# Patient Record
Sex: FEMALE | Race: WHITE | NOT HISPANIC OR LATINO
[De-identification: names, ages, dates, MRNs, and addresses within clinical notes are randomized per-mention and may not be internally consistent; named-entity substitution may affect disease eponyms.]

---

## 2019-10-31 PROBLEM — Z00.00 ENCOUNTER FOR PREVENTIVE HEALTH EXAMINATION: Status: ACTIVE | Noted: 2019-10-31

## 2019-11-18 ENCOUNTER — APPOINTMENT (OUTPATIENT)
Dept: ORTHOPEDIC SURGERY | Facility: CLINIC | Age: 64
End: 2019-11-18
Payer: COMMERCIAL

## 2019-11-18 VITALS — BODY MASS INDEX: 35.55 KG/M2 | WEIGHT: 240 LBS | HEIGHT: 69 IN

## 2019-11-18 DIAGNOSIS — Z80.9 FAMILY HISTORY OF MALIGNANT NEOPLASM, UNSPECIFIED: ICD-10-CM

## 2019-11-18 DIAGNOSIS — Z87.09 PERSONAL HISTORY OF OTHER DISEASES OF THE RESPIRATORY SYSTEM: ICD-10-CM

## 2019-11-18 DIAGNOSIS — M25.561 PAIN IN RIGHT KNEE: ICD-10-CM

## 2019-11-18 DIAGNOSIS — Z78.9 OTHER SPECIFIED HEALTH STATUS: ICD-10-CM

## 2019-11-18 PROCEDURE — 99203 OFFICE O/P NEW LOW 30 MIN: CPT

## 2019-11-18 PROCEDURE — 73562 X-RAY EXAM OF KNEE 3: CPT | Mod: RT

## 2019-11-18 RX ORDER — ACETAMINOPHEN 160 MG/5ML
500 SUSPENSION ORAL
Refills: 0 | Status: ACTIVE | COMMUNITY

## 2019-11-18 RX ORDER — BUPROPION HYDROCHLORIDE 75 MG/1
75 TABLET, FILM COATED ORAL
Refills: 0 | Status: ACTIVE | COMMUNITY

## 2019-11-18 RX ORDER — FLUOXETINE HYDROCHLORIDE 40 MG/1
40 CAPSULE ORAL
Refills: 0 | Status: ACTIVE | COMMUNITY

## 2019-11-18 RX ORDER — METOPROLOL SUCCINATE 100 MG
100 TABLET, EXTENDED RELEASE 24 HR ORAL
Refills: 0 | Status: ACTIVE | COMMUNITY

## 2019-11-18 RX ORDER — AMLODIPINE BESYLATE 10 MG/1
10 TABLET ORAL
Refills: 0 | Status: ACTIVE | COMMUNITY

## 2019-12-26 ENCOUNTER — APPOINTMENT (OUTPATIENT)
Dept: ORTHOPEDIC SURGERY | Facility: CLINIC | Age: 64
End: 2019-12-26
Payer: COMMERCIAL

## 2019-12-26 VITALS — HEIGHT: 69 IN | BODY MASS INDEX: 35.55 KG/M2 | WEIGHT: 240 LBS

## 2019-12-26 PROCEDURE — 99214 OFFICE O/P EST MOD 30 MIN: CPT

## 2019-12-26 PROCEDURE — 73562 X-RAY EXAM OF KNEE 3: CPT | Mod: LT

## 2019-12-26 NOTE — DISCUSSION/SUMMARY
[de-identified] : I went over the pathophysiology of the patient's symptoms in great detail and used models to aid in my explanation. I stressed the importance of continued weight loss and its benefits to the patient’s joints and overall health. At this time, she will start a course of physical therapy for strengthening and flexibility. A prescription for physical therapy was provided. We discussed the use of ice, Tylenol and anti-inflammatories to relieve pain. The patient was instructed in ROM exercises they are to do at home. At-home strengthening exercises were discussed and demonstrated with the patient. She needs to avoid high-impact activities such as running and jumping. I recommend alternative activities such as riding a stationary bike on low tension, or the elliptical. She should focus on light weight and high repetition exercises. She  should avoid squatting, and kneeling. If her symptoms do not resolve we will discuss the possibility of cortisone injection. \par \par FU 1 month.

## 2019-12-26 NOTE — PHYSICAL EXAM
[de-identified] : Constitutional\par o Appearance : well-nourished, well developed, alert, in no acute distress \par Head and Face\par o Head :\par ¦ Inspection : atraumatic, normocephalic\par o Face :\par ¦ Inspection : no visible rash or discoloration\par Respiratory\par o Respiratory Effort: breathing unlabored \par Neurologic\par o Mental Status Examination :\par ¦ Orientation : alert and oriented X 3\par Psychiatric\par o Mood and Affect: mood normal, affect appropriate\par Cardiovascular\par o Observation/Palpation : - no swelling\par Lymphatic\par o Additional Nodes : No palpable lymph nodes present\par \par Right Lower Extremity\par o Buttock : no tenderness, swelling or deformities \par o Right Hip :\par    - Inspection/Palpation : no tenderness, no swelling or deformities\par    - Range of Motion : full and painless in all planes, no crepitance\par    - Stability : joint stability intact\par    - Strength : extension, flexion, adduction, abduction, internal rotation and external rotation 4/5\par    - Tests: Irvin’s test negative\par o Right Knee :\par ¦ Inspection/Palpation : moderate medial compartment tenderness no lateral compartment tenderness to palpation, no swelling\par ¦ Range of Motion : active flexion and extension full and painless, no crepitance\par ¦ Stability : no valgus or varus instability present on provocative testing\par ¦ Strength : flexion and extension 4/5\par ¦ Tests and Signs : negative Anterior Drawer, negative Lachman, negative Zaina\par \par Left Lower Extremity\par o Buttock : no tenderness, swelling or deformities \par o Left Hip :\par    - Inspection/Palpation : no tenderness, no swelling or deformities\par    - Range of Motion : full and painless in all planes, no crepitance\par    - Stability : joint stability intact\par    - Strength : extension, flexion, adduction, abduction, internal rotation and external rotation 4-/5 \par    - Tests: Irvin’s test negative\par o Left Knee :\par ¦ Inspection/Palpation : moderate medial compartment tenderness no lateral compartment tenderness to palpation, no swelling\par ¦ Range of Motion : active flexion and extension full and painless, no crepitance\par ¦ Stability : no valgus or varus instability present on provocative testing\par ¦ Strength : flexion and extension 4-/5\par ¦ Tests and Signs : negative Anterior Drawer, negative Lachman, negative Zaina\par \par Gait and Station:\par Gait: gait normal, no significant extremity swelling or lymphedema, good proprioception and balance, unable to do a sit up\par \par Radiology Results \par o Left Knee : Standing AP,lateral and tunnel views of the knee were obtained and revealed moderate medial and moderate to severe patellofemoral arthritis

## 2019-12-26 NOTE — HISTORY OF PRESENT ILLNESS
[de-identified] : 64 year old female presents with right knee and left knee pain for 1.5 months. She cannot attribute this pain to any specific injury. She reports difficulty stair climbing and pain and stiffness after prolonged sitting.  She has buckling episodes while ambulating.  She denies injury. She has taken Motrin which has provided some relief for her symptoms.  She is not athletically active and does not exercise on a daily basis.  She notes no recent falls or trauma. She has been going to PT and states moderate symptom relief. She states that her right  knee feels better, but her left  knee has begun to hurt her due to her compensatory mechanism.

## 2019-12-26 NOTE — ADDENDUM
[FreeTextEntry1] : I, Sterling Dunaway , acted solely as a scribe for Dr. Donte Javris on this date 12/26/2019.\par All medical record entries made by the Scribe were at my, Dr. Donte Jarvis, direction and personally dictated by me on 12/26/2019. I have reviewed the chart and agree that the record accurately reflects my personal performance of the history, physical exam, assessment and plan. I have also personally directed, reviewed, and agreed with the chart.

## 2020-02-06 ENCOUNTER — APPOINTMENT (OUTPATIENT)
Dept: ORTHOPEDIC SURGERY | Facility: CLINIC | Age: 65
End: 2020-02-06
Payer: COMMERCIAL

## 2020-02-06 VITALS — WEIGHT: 240 LBS | HEIGHT: 69 IN | BODY MASS INDEX: 35.55 KG/M2

## 2020-02-06 PROCEDURE — 99213 OFFICE O/P EST LOW 20 MIN: CPT

## 2020-03-12 ENCOUNTER — APPOINTMENT (OUTPATIENT)
Dept: ORTHOPEDIC SURGERY | Facility: CLINIC | Age: 65
End: 2020-03-12
Payer: COMMERCIAL

## 2020-03-12 PROCEDURE — 99213 OFFICE O/P EST LOW 20 MIN: CPT

## 2020-03-12 NOTE — ADDENDUM
[FreeTextEntry1] : I, Ken Guerrero, acted solely as a scribe for Dr. Donte Jarvis on this date 03/12/2020 .\par All medical record entries made by the Scribe were at my, Dr. Donte Jarvis, direction and personally dictated by me on 03/12/2020 . I have reviewed the chart and agree that the record accurately reflects my personal performance of the history, physical exam, assessment and plan. I have also personally directed, reviewed, and agreed with the chart.

## 2020-03-12 NOTE — HISTORY OF PRESENT ILLNESS
[de-identified] : 64 year old female presents with right knee and left knee pain for 3 months. She cannot attribute this pain to any specific injury. She reports difficulty stair climbing and pain and stiffness after prolonged sitting.  She has buckling episodes while ambulating.  She denies injury. She has taken Motrin which has provided some relief for her symptoms.  She is not athletically active and does not exercise on a daily basis.  She notes no recent falls or trauma. She has been going to PT and states moderate symptom relief but has stopped due to loss in family. She states that her right  knee feels better, but her left  knee has begun to hurt her due to her compensatory mechanism. Her left knee is more painful today than her right knee.She Localizes the pain to the left iliotibial band as well

## 2020-03-12 NOTE — DISCUSSION/SUMMARY
[de-identified] : I went over the pathophysiology of the patient's symptoms in great detail and used models to aid in my explanation. I stressed the importance of continued weight loss and its benefits to the patient’s joints and overall health. We discussed the use of ice, Tylenol and anti-inflammatories to relieve pain. The patient was instructed in ROM exercises they are to do at home. At-home strengthening, and stretching exercises were discussed and demonstrated with the patient. The patient was instructed in ROM exercises they are to do at home. At this time, she will start a course of physical therapy for strengthening and flexibility. A prescription for physical therapy was provided. I stressed the importance of continued weight loss and its benefits to the patient’s joints and overall health. She needs to avoid high-impact activities such as running and jumping. I recommend alternate activities such as yoga, pilates, barre classes, toning classes, and riding a stationary bike on low tension. We went over the wide ranging benefits of exercise for the patient health and I encouraged her to begin a diligent exercise program. All of her questions were answered. She is understanding and consents to the plan. \par \par FU 6 weeks

## 2020-03-12 NOTE — PHYSICAL EXAM
[de-identified] : Constitutional\par o Appearance : well-nourished, well developed, alert, in no acute distress \par Head and Face\par o Head :\par ¦ Inspection : atraumatic, normocephalic\par o Face :\par ¦ Inspection : no visible rash or discoloration\par Respiratory\par o Respiratory Effort: breathing unlabored \par Neurologic\par o Mental Status Examination :\par ¦ Orientation : alert and oriented X 3\par Psychiatric\par o Mood and Affect: mood normal, affect appropriate\par Cardiovascular\par o Observation/Palpation : - no swelling\par Lymphatic\par o Additional Nodes : No palpable lymph nodes present\par \par Right Lower Extremity\par o Buttock : no tenderness, swelling or deformities \par o Right Hip :\par    - Inspection/Palpation : no tenderness, no swelling or deformities\par    - Range of Motion : full and painless in all planes, no crepitance\par    - Stability : joint stability intact\par    - Strength : extension, flexion, adduction, abduction 4+/5, internal rotation and external rotation 4/5\par    - Tests: Irvin’s test negative\par o Right Knee :\par ¦ Inspection/Palpation : no tenderness, no swelling\par ¦ Range of Motion : active flexion and extension full and pain on full extension, no crepitance, decreased patellofemoral glide\par ¦ Stability : no valgus or varus instability present on provocative testing\par ¦ Strength : flexion and extension 5/5\par ¦ Tests and Signs : negative Anterior Drawer, negative Lachman, negative Zaina\par \par Left Lower Extremity\par o Buttock : no tenderness, swelling or deformities \par o Left Hip :\par    - Inspection/Palpation :diffuse ITB tenderness, no swelling or deformities\par    - Range of Motion : full and painless in all planes, no crepitance\par    - Stability : joint stability intact\par    - Strength : extension, flexion, adduction, abduction 3+/5, internal rotation and external rotation 4/5 \par    - Tests: Irvin’s test negative\par o Left Knee :\par ¦ Inspection/Palpation : no swelling, tenderness over IT band\par ¦ Range of Motion : active flexion and extension full and pain on full extension, no crepitance decreased patellofemoral glide\par ¦ Stability : no valgus or varus instability present on provocative testing\par ¦ Strength : flexion and extension 4+/5\par ¦ Tests and Signs : negative Anterior Drawer, negative Lachman, negative Zaina\par \par Gait and Station:\par Gait: gait normal, no significant extremity swelling or lymphedema, good proprioception and balance, unable to do a sit up, poor core strength

## 2020-04-23 ENCOUNTER — APPOINTMENT (OUTPATIENT)
Dept: ORTHOPEDIC SURGERY | Facility: CLINIC | Age: 65
End: 2020-04-23

## 2020-06-15 ENCOUNTER — APPOINTMENT (OUTPATIENT)
Dept: ORTHOPEDIC SURGERY | Facility: CLINIC | Age: 65
End: 2020-06-15
Payer: COMMERCIAL

## 2020-06-15 PROCEDURE — 99214 OFFICE O/P EST MOD 30 MIN: CPT

## 2020-06-15 PROCEDURE — 73502 X-RAY EXAM HIP UNI 2-3 VIEWS: CPT | Mod: LT

## 2020-06-15 PROCEDURE — 72100 X-RAY EXAM L-S SPINE 2/3 VWS: CPT

## 2020-06-15 NOTE — HISTORY OF PRESENT ILLNESS
[de-identified] : 64 year old female presents with left lateral hip pain and buttock pain. The pain began 2-3 weeks ago. There was no injury. Her pain does radiate into her hamstring and inner thigh but does not cross the knee into the lower leg. The pain is only on the left side. She denies numbness and tingling. She notes that she is listing to the right while walking. Her lower back does not ache very much. She has exacerbated pain on stairs and ascending is worse than descending. She is fairly okay with walking on flat ground. Long distances can be challenging. She also has some pain when laying on her left side at night. She denies groin pain. Her knees are not bothering her very much at this time. The patient is present today with her . Of Note: She is on Plavix and Aspirin.

## 2020-06-15 NOTE — ADDENDUM
[FreeTextEntry1] : I, Alexsander Gay, acted solely as a scribe for Dr. Donte Jarvis on this date 06/15/2020.\par All medical record entries made by the Scribe were at my, Dr. Donte Jarvis, direction and personally dictated by me on 06/15/2020. I have reviewed the chart and agree that the record accurately reflects my personal performance of the history, physical exam, assessment and plan. I have also personally directed, reviewed, and agreed with the chart.

## 2020-06-15 NOTE — DISCUSSION/SUMMARY
[de-identified] : I discussed the underlying pathophysiology of the patient's condition in great detail with the patient. I went over the patient's x-rays with them in great detail. We discussed the use of ice, Tylenol and anti-inflammatories to relieve pain.  A prescription for Celebrex was provided. The patient was instructed in ROM exercises they are to do at home. At-home strengthening, and stretching exercises were discussed and demonstrated with the patient. We went over the wide ranging benefits of exercise for the patient health and I encouraged her to begin a diligent exercise program. She needs to avoid high-impact activities such as running and jumping. I recommend alternative activities such as riding a stationary bike on low tension, or the elliptical. She should focus on light weight and high repetition exercises. She should avoid squatting, and kneeling. I stressed the importance of continued weight loss and its benefits to the patient’s joints and overall health. Proper cane walking protocol were discussed and demonstrated with the patient. At this time, she will start a course of physical therapy for strengthening and flexibility. A prescription for physical therapy was provided. All of her questions were answered. She understands and consents to the plan. This treatment plan was discussed and reviewed with the pt's  who agrees with the treatment course.

## 2020-06-15 NOTE — PHYSICAL EXAM
[de-identified] : Constitutional\par o Appearance : well-nourished, well developed, alert, in no acute distress \par Head and Face\par o Head :\par ¦ Inspection : atraumatic, normocephalic\par o Face :\par ¦ Inspection : no visible rash or discoloration\par Respiratory\par o Respiratory Effort: breathing unlabored \par Neurologic\par o Mental Status Examination :\par ¦ Orientation : alert and oriented X 3\par Psychiatric\par o Mood and Affect: mood normal, affect appropriate\par Cardiovascular\par o Observation/Palpation : - no swelling\par Lymphatic\par o Additional Nodes : No palpable lymph nodes present\par \par Lumbosacral Spine\par o Inspection : no visible rash or discoloration\par o Palpation : pain with sidebending L>R, left SI joint and sciatic notch tenderness\par o Range of Motion : arc of motion full in all planes, no crepitance or pain with ROM\par o Muscle Strength : paraspinal muscle strength and tone within normal limits\par o Muscle Tone : paraspinal muscle strength and tone within normal limits\par Tests: straight leg test negative and BAILEY test negative bilaterally \par \par Right Lower Extremity\par o Buttock : no tenderness, swelling or deformities \par o Right Hip :\par    - Inspection/Palpation : no tenderness, no swelling or deformities\par    - Range of Motion : full and painless in all planes, full flexion and rotation, no crepitance\par    - Stability : joint stability intact\par    - Strength : extension, flexion, adduction 4-/5, abduction 4+/5, internal rotation and external rotation 4-/5\par    - Tests: Irvin’s test negative\par o Right Knee :\par ¦ Inspection/Palpation : no tenderness, no swelling\par ¦ Range of Motion : active flexion and extension full and pain on full extension, no crepitance, decreased patellofemoral glide\par ¦ Stability : no valgus or varus instability present on provocative testing\par ¦ Strength : flexion and extension 4-/5\par ¦ Tests and Signs : negative Anterior Drawer, negative Lachman, negative Zaina\par \par Left Lower Extremity\par o Buttock : no tenderness, swelling or deformities \par o Left Hip :\par    - Inspection/Palpation :diffuse greater  trochanteric and ITB tenderness, no swelling or deformities\par    - Range of Motion : full and painless in all planes, no crepitance\par    - Stability : joint stability intact\par    - Strength : extension, flexion, adduction, abduction 3+/5, internal rotation and external rotation 3+/5 \par    - Tests: Irvin’s test negative\par o Left Knee :\par ¦ Inspection/Palpation : no swelling, tenderness over IT band\par ¦ Range of Motion : active flexion and extension full and pain on full extension, no crepitance decreased patellofemoral glide\par ¦ Stability : no valgus or varus instability present on provocative testing\par ¦ Strength : flexion and extension 3+/5\par ¦ Tests and Signs : negative Anterior Drawer, negative Lachman, negative Zaina\par \par Gait and Station:\par Gait: Abductor lurch, no significant extremity swelling or lymphedema, good proprioception and balance, unable to do a sit up, poor core strength\par \par Radiology Results\par o Lumbosacral Spine : AP and lateral views were obtained and reveal diffuse facet arthropathy with severe foraminal stenosis of L5/S1\par o Hip and Pelvis: AP pelvis and lateral of both hips are obtained and reveal mild-moderate degenerative arthritis of both hips R>L

## 2020-06-16 RX ORDER — MELOXICAM 15 MG/1
15 TABLET ORAL
Qty: 30 | Refills: 3 | Status: ACTIVE | COMMUNITY
Start: 2020-06-16 | End: 1900-01-01

## 2020-07-15 ENCOUNTER — APPOINTMENT (OUTPATIENT)
Dept: ORTHOPEDIC SURGERY | Facility: CLINIC | Age: 65
End: 2020-07-15
Payer: COMMERCIAL

## 2020-07-15 PROCEDURE — 99214 OFFICE O/P EST MOD 30 MIN: CPT

## 2020-07-15 RX ORDER — CELECOXIB 200 MG/1
200 CAPSULE ORAL
Qty: 30 | Refills: 3 | Status: DISCONTINUED | COMMUNITY
Start: 2020-06-15 | End: 2020-07-15

## 2020-07-15 NOTE — HISTORY OF PRESENT ILLNESS
[de-identified] : 64 year old female presents with left lateral hip pain and buttock pain. The pain began 2 months ago. There was no injury. Her pain does radiate into her hamstring and inner thigh but does not cross the knee into the lower leg. She denies groin pain. The pain is only on the left side. She denies numbness and tingling. She notes that she is listing to the right while walking. Her lower back does not ache very much. She has exacerbated pain on stairs and ascending is worse than descending. She has exacerbated pain with sitting for long periods of time. She is fairly okay with walking on flat ground. Long distances can be challenging. She also pain when laying on her left side at night, which makes sleeping difficult. Her knees are not bothering her very much at this time and she denies buckling. She has been attending PT. She tried taking Mobic but notes that her hair started falling out so she stopped taking it. She has been managing her pain with Tylenol without much relief. Of Note: She is on Aspirin.\par \par Radiology Results done 6/15/2020 revealed\par o Lumbosacral Spine : AP and lateral views were obtained and reveal diffuse facet arthropathy with severe foraminal stenosis of L5/S1.\par o Hip and Pelvis: AP pelvis and lateral of both hips are obtained and reveal mild-moderate degenerative arthritis of both hips, right worse than left.

## 2020-07-15 NOTE — PHYSICAL EXAM
[de-identified] : Constitutional\par o Appearance : well-nourished, well developed, alert, in no acute distress \par Head and Face\par o Head :\par ¦ Inspection : atraumatic, normocephalic\par o Face :\par ¦ Inspection : no visible rash or discoloration\par Respiratory\par o Respiratory Effort: breathing unlabored \par Neurologic\par o Mental Status Examination :\par ¦ Orientation : alert and oriented X 3\par Psychiatric\par o Mood and Affect: mood normal, affect appropriate\par Cardiovascular\par o Observation/Palpation : - no swelling\par Lymphatic\par o Additional Nodes : No palpable lymph nodes present\par \par Lumbosacral Spine\par o Inspection : no visible rash or discoloration\par o Palpation : left SI joint and sciatic notch tenderness\par o Range of Motion : discomfort with extension, sidebending to the left causes back pain but not leg pain, no discomfort with rotation, no crepitance with ROM\par o Muscle Strength : paraspinal muscle strength and tone within normal limits\par o Muscle Tone : paraspinal muscle strength and tone within normal limits\par Tests: straight leg test negative and BAILEY test negative bilaterally \par \par Right Lower Extremity\par o Buttock : no tenderness, swelling or deformities \par o Right Hip :\par    - Inspection/Palpation : no tenderness, no swelling or deformities\par    - Range of Motion : full flexion and mild discomfort with rotation, no crepitance\par    - Stability : joint stability intact\par    - Strength : extension, flexion, adduction, internal rotation and external rotation 4-/5, abduction 4+/5\par    - Tests: Irvin’s test negative\par o Right Knee :\par ¦ Inspection/Palpation : no tenderness, no swelling\par ¦ Range of Motion : active flexion and extension full and pain on full extension, no crepitance, decreased patellofemoral glide\par ¦ Stability : no valgus or varus instability present on provocative testing\par ¦ Strength : flexion and extension 4-/5\par ¦ Tests and Signs : negative Anterior Drawer, negative Lachman, negative Zaina\par \par Left Lower Extremity\par o Buttock : no tenderness, swelling or deformities \par o Left Hip :\par    - Inspection/Palpation : tenderness over the IT band, no swelling or deformities\par    - Range of Motion : full in all planes, internal rotation no discomfort, external rotation causes mild discomfort, no crepitance\par    - Stability : joint stability intact\par    - Strength : extension, flexion, adduction, internal rotation and external rotation 4-/5, abduction 3+/5\par    - Tests: Irvin’s test negative\par o Left Knee :\par ¦ Inspection/Palpation : no swelling, tenderness over IT band\par ¦ Range of Motion : active flexion and extension full and pain on full extension, no crepitance decreased patellofemoral glide\par ¦ Stability : no valgus or varus instability present on provocative testing\par ¦ Strength : flexion and extension 4-/5\par ¦ Tests and Signs : negative Anterior Drawer, negative Lachman, negative Zaina\par \par Gait and Station:\par Gait: Abductor lurch, no significant extremity swelling or lymphedema, good proprioception and balance, unable to do a sit up, poor core strength

## 2020-07-15 NOTE — ADDENDUM
[FreeTextEntry1] : I, Alexasnder Gay, acted solely as a scribe for Dr. Donte Jarvis on this date 07/15/2020.\par All medical record entries made by the Scribe were at my, Dr. Donte Jarvis, direction and personally dictated by me on 07/15/2020. I have reviewed the chart and agree that the record accurately reflects my personal performance of the history, physical exam, assessment and plan. I have also personally directed, reviewed, and agreed with the chart.

## 2020-07-15 NOTE — DISCUSSION/SUMMARY
[de-identified] : I discussed the underlying pathophysiology of the patient's condition in great detail with the patient. We discussed the use of ice, Tylenol and anti-inflammatories to relieve pain. The patient was instructed in ROM exercises they are to do at home. At-home strengthening, and stretching exercises were discussed and demonstrated with the patient. We went over the wide ranging benefits of exercise for the patient health and I encouraged her to begin a diligent exercise program. She needs to avoid high-impact activities such as running and jumping. I recommend alternative activities such as riding a stationary bike on low tension, or the elliptical. She should focus on light weight and high repetition exercises. She should avoid squatting, and kneeling. I stressed the importance of continued weight loss and its benefits to the patient’s joints and overall health. I advised her to walk with a cane or walking stick in her right hand for more support. Proper cane walking protocol were discussed and demonstrated with the patient. I am recommending the patient continue going to physical therapy to obtain increases in their strength and mobility. A prescription for PT was provided to the patient. I recommend that she can take Celebrex to better manage her pain. A prescription for Celebrex was provided. All of her questions were answered. She understands and consents to the plan. This treatment plan was discussed and reviewed with the pt's  who agrees with the treatment course. \par \par FU 1 month.\par after continuing PT for her low back and legs.\par after taking Celebrex to better manage her pain.\par after focusing on losing weight.

## 2020-08-19 ENCOUNTER — APPOINTMENT (OUTPATIENT)
Dept: ORTHOPEDIC SURGERY | Facility: CLINIC | Age: 65
End: 2020-08-19
Payer: COMMERCIAL

## 2020-08-19 PROCEDURE — 99214 OFFICE O/P EST MOD 30 MIN: CPT

## 2020-09-15 ENCOUNTER — APPOINTMENT (OUTPATIENT)
Dept: MRI IMAGING | Facility: CLINIC | Age: 65
End: 2020-09-15
Payer: COMMERCIAL

## 2020-09-15 ENCOUNTER — OUTPATIENT (OUTPATIENT)
Dept: OUTPATIENT SERVICES | Facility: HOSPITAL | Age: 65
LOS: 1 days | End: 2020-09-15
Payer: COMMERCIAL

## 2020-09-15 ENCOUNTER — RESULT REVIEW (OUTPATIENT)
Age: 65
End: 2020-09-15

## 2020-09-15 DIAGNOSIS — Z00.8 ENCOUNTER FOR OTHER GENERAL EXAMINATION: ICD-10-CM

## 2020-09-15 PROCEDURE — 72148 MRI LUMBAR SPINE W/O DYE: CPT

## 2020-09-15 PROCEDURE — 72148 MRI LUMBAR SPINE W/O DYE: CPT | Mod: 26

## 2020-09-21 ENCOUNTER — APPOINTMENT (OUTPATIENT)
Dept: ORTHOPEDIC SURGERY | Facility: CLINIC | Age: 65
End: 2020-09-21
Payer: COMMERCIAL

## 2020-09-21 PROCEDURE — 99214 OFFICE O/P EST MOD 30 MIN: CPT

## 2020-10-21 ENCOUNTER — APPOINTMENT (OUTPATIENT)
Dept: ORTHOPEDIC SURGERY | Facility: CLINIC | Age: 65
End: 2020-10-21
Payer: COMMERCIAL

## 2020-10-21 PROCEDURE — 99214 OFFICE O/P EST MOD 30 MIN: CPT

## 2020-10-21 PROCEDURE — 99072 ADDL SUPL MATRL&STAF TM PHE: CPT

## 2020-10-21 RX ORDER — TRAMADOL HYDROCHLORIDE 50 MG/1
50 TABLET, COATED ORAL
Qty: 20 | Refills: 0 | Status: ACTIVE | COMMUNITY
Start: 2020-10-21 | End: 1900-01-01

## 2020-12-02 ENCOUNTER — APPOINTMENT (OUTPATIENT)
Dept: ORTHOPEDIC SURGERY | Facility: CLINIC | Age: 65
End: 2020-12-02
Payer: COMMERCIAL

## 2020-12-02 DIAGNOSIS — S40.011A CONTUSION OF RIGHT SHOULDER, INITIAL ENCOUNTER: ICD-10-CM

## 2020-12-02 DIAGNOSIS — M19.019 PRIMARY OSTEOARTHRITIS, UNSPECIFIED SHOULDER: ICD-10-CM

## 2020-12-02 PROCEDURE — 73030 X-RAY EXAM OF SHOULDER: CPT | Mod: RT

## 2020-12-02 PROCEDURE — 99214 OFFICE O/P EST MOD 30 MIN: CPT

## 2020-12-02 PROCEDURE — 99072 ADDL SUPL MATRL&STAF TM PHE: CPT

## 2020-12-02 RX ORDER — CYCLOBENZAPRINE HYDROCHLORIDE 10 MG/1
10 TABLET, FILM COATED ORAL 3 TIMES DAILY
Qty: 30 | Refills: 3 | Status: ACTIVE | COMMUNITY
Start: 2020-12-02 | End: 1900-01-01

## 2020-12-02 RX ORDER — CELECOXIB 200 MG/1
200 CAPSULE ORAL
Qty: 30 | Refills: 3 | Status: ACTIVE | COMMUNITY
Start: 2020-06-17 | End: 1900-01-01

## 2020-12-03 RX ORDER — CELECOXIB 200 MG/1
200 CAPSULE ORAL DAILY
Qty: 30 | Refills: 0 | Status: ACTIVE | COMMUNITY
Start: 2020-12-03 | End: 1900-01-01

## 2021-01-04 ENCOUNTER — APPOINTMENT (OUTPATIENT)
Dept: ORTHOPEDIC SURGERY | Facility: CLINIC | Age: 66
End: 2021-01-04
Payer: COMMERCIAL

## 2021-01-04 DIAGNOSIS — M76.32 ILIOTIBIAL BAND SYNDROME, LEFT LEG: ICD-10-CM

## 2021-01-04 PROCEDURE — 99214 OFFICE O/P EST MOD 30 MIN: CPT

## 2021-01-04 PROCEDURE — 99072 ADDL SUPL MATRL&STAF TM PHE: CPT

## 2021-01-11 ENCOUNTER — RX RENEWAL (OUTPATIENT)
Age: 66
End: 2021-01-11

## 2021-01-11 RX ORDER — LIDOCAINE 5% 700 MG/1
5 PATCH TOPICAL
Qty: 30 | Refills: 1 | Status: ACTIVE | COMMUNITY
Start: 2020-09-21 | End: 1900-01-01

## 2021-02-17 ENCOUNTER — APPOINTMENT (OUTPATIENT)
Dept: ORTHOPEDIC SURGERY | Facility: CLINIC | Age: 66
End: 2021-02-17
Payer: COMMERCIAL

## 2021-02-17 PROCEDURE — 99072 ADDL SUPL MATRL&STAF TM PHE: CPT

## 2021-02-17 PROCEDURE — 99214 OFFICE O/P EST MOD 30 MIN: CPT

## 2021-02-17 NOTE — DISCUSSION/SUMMARY
[de-identified] : I discussed the underlying pathophysiology of the patient's condition in great detail with the patient. We discussed the use of ice, Tylenol and anti-inflammatories to relieve pain. She can also try OTC Voltaren gel, and was provided with a sample of a 2% Pennsaid gel. She should avoid any heavy lifting or carrying. She should avoid arching and twisting her back. I stressed the importance of continued weight loss and its benefits to the patient’s joints and overall health. She will d/c PT at this time and should continue with the exercises on her own. All of her questions were answered. She understands and consents to the plan. \par \par FU 3 months.\par after following a diligent HEP.\par after focusing on losing weight.

## 2021-02-17 NOTE — ADDENDUM
[FreeTextEntry1] : I, Alexsander Gay, acted solely as a scribe for Dr. Donte Jarvis on this date 02/17/2021.\par All medical record entries made by the Scribe were at my, Dr. Donte Jarvis, direction and personally dictated by me on 02/17/2021. I have reviewed the chart and agree that the record accurately reflects my personal performance of the history, physical exam, assessment and plan. I have also personally directed, reviewed, and agreed with the chart.

## 2021-02-17 NOTE — PHYSICAL EXAM
[de-identified] : Constitutional\par o Appearance : well-nourished, well developed, alert, in no acute distress \par Head and Face\par o Head :\par ¦ Inspection : atraumatic, normocephalic\par o Face :\par ¦ Inspection : no visible rash or discoloration\par Respiratory\par o Respiratory Effort: breathing unlabored \par Neurologic\par o Mental Status Examination :\par ¦ Orientation : alert and oriented X 3\par Psychiatric\par o Mood and Affect: mood normal, affect appropriate\par Cardiovascular\par o Observation/Palpation : - no swelling\par Lymphatic\par o Additional Nodes : No palpable lymph nodes present\par \par Lumbosacral Spine\par o Inspection : no visible rash or discoloration\par o Palpation : left paraspinal and SI joint tenderness, no sciatic notch tenderness\par o Range of Motion : sidebending to the left causes right paralumbar discomfort, rotation to the right causes discomfort, no discomfort with extension, limited sidebending, rotation and extension\par o Muscle Strength : paraspinal muscle strength and tone within normal limits\par o Muscle Tone : paraspinal muscle strength and tone within normal limits\par Tests: straight leg test negative and BAILEY test negative bilaterally \par \par Right Lower Extremity\par o Buttock : no tenderness, swelling or deformities \par o Right Hip :\par    - Inspection/Palpation : no tenderness, no swelling or deformities\par    - Range of Motion : full and painless in all planes, no crepitance\par    - Stability : joint stability intact\par    - Strength : extension, flexion, adduction, internal rotation and external rotation, abduction, 4+/5\par    - Tests: Irvin’s test negative\par \par Left Lower Extremity\par o Buttock : no tenderness, swelling or deformities \par o Left Hip :\par    - Inspection/Palpation : tenderness over the greater trochanter and IT band, no swelling or deformities\par    - Range of Motion : full and painless in all planes, no crepitance\par    - Stability : joint stability intact\par    - Strength : extension, flexion, adduction, internal rotation and external rotation, abduction, 4+/5\par    - Tests: Irvin’s test negative\par \par Gait and Station:\par Gait: no abductor lurch, no significant extremity swelling or lymphedema, good proprioception and balance, tight hamstrings bilaterally, limited core strength, no foot drop bilaterally, slightly decreased sensation to light touch in the lateral aspect of the left thigh, leg lengths are equal

## 2021-02-17 NOTE — HISTORY OF PRESENT ILLNESS
[de-identified] : 65 year old female presents with left-sided lower back and lateral left hip pain. She is unable to walk long distances due to the pain and is constantly looking for a place to sit. She denies groin pain, numbness, tingling. She has continued going to PT but does not feel that it is helpful. She takes Celebrex and uses lidocaine patches, which help. She continues to have weakness and pain in her back, but denies that it radiates down her leg. She is aware that weight loss is important.\par \par MRI of the lumbar spine done on 9/15/2020 revealed: \par 1. Multilevel lumbar spondylosis most advanced at L2-L3 with a left foraminal/extraforaminal disc protrusion impinging upon the left exiting L2 and left descending L3 nerve roots with mild left foraminal narrowing.\par 2. Mild central disc protrusion at L4-5 with mild to moderate left and moderate right foraminal narrowing.\par 3. Additional degenerative changes including an annular fissure at L3-4.

## 2021-05-17 ENCOUNTER — APPOINTMENT (OUTPATIENT)
Dept: ORTHOPEDIC SURGERY | Facility: CLINIC | Age: 66
End: 2021-05-17
Payer: COMMERCIAL

## 2021-05-17 PROCEDURE — 99214 OFFICE O/P EST MOD 30 MIN: CPT

## 2021-05-17 PROCEDURE — 99072 ADDL SUPL MATRL&STAF TM PHE: CPT

## 2021-05-17 NOTE — ADDENDUM
[FreeTextEntry1] : I, Alexsander Gay, acted solely as a scribe for Dr. Donte Jarvis on this date 05/17/2021.\par All medical record entries made by the Scribe were at my, Dr. Donte Jarvis, direction and personally dictated by me on 05/17/2021. I have reviewed the chart and agree that the record accurately reflects my personal performance of the history, physical exam, assessment and plan. I have also personally directed, reviewed, and agreed with the chart.

## 2021-05-17 NOTE — HISTORY OF PRESENT ILLNESS
[de-identified] : 65 year old female presents complaining of left-sided low back pain. She notes she has been using an inversion table that does cause right-sided pain. She is not in PT at this point, and has been doing some exercises on her own. She is unable to walk long distances due to the pain and is constantly looking for a place to sit. She notes that she walks very slowly. She denies groin pain, numbness, or tingling. She has never had injections in her back. She also complains of right knee pain that worsens when she walks and ascends stairs. She understands that weight loss is important but has not made much progress.\par \par MRI of the Lumbar Spine obtained on 9/15/2020 revealed: \par 1. Multilevel lumbar spondylosis most advanced at L2-L3 with a left foraminal/extraforaminal disc protrusion impinging upon the left exiting L2 and left descending L3 nerve roots with mild left foraminal narrowing.\par 2. Mild central disc protrusion at L4-5 with mild to moderate left and moderate right foraminal narrowing.\par 3. Additional degenerative changes including an annular fissure at L3-4.

## 2021-05-17 NOTE — PHYSICAL EXAM
[de-identified] : Constitutional\par o Appearance : well-nourished, well developed, alert, in no acute distress \par Head and Face\par o Head :\par ¦ Inspection : atraumatic, normocephalic\par o Face :\par ¦ Inspection : no visible rash or discoloration\par Respiratory\par o Respiratory Effort: breathing unlabored \par Neurologic\par o Mental Status Examination :\par ¦ Orientation : alert and oriented X 3\par Psychiatric\par o Mood and Affect: mood normal, affect appropriate\par Cardiovascular\par o Observation/Palpation : - no swelling\par Lymphatic\par o Additional Nodes : No palpable lymph nodes present\par \par Lumbosacral Spine\par o Inspection : no visible rash or discoloration\par o Palpation : tenderness over the L4/L5 level, left sciatic notch and SI joint tenderness\par o Range of Motion : extension causes discomfort, forward flexion causes discomfort, very limited sidebending to the left which is painful, sidebending to the right is not painful, rotation to the right causes left para lumbar and buttock discomfort\par o Muscle Strength : paraspinal muscle strength and tone within normal limits\par o Muscle Tone : paraspinal muscle strength and tone within normal limits\par o Tests: straight leg test negative and BAILEY test negative bilaterally \par \par Right Lower Extremity\par o Buttock : no tenderness, swelling or deformities \par o Right Hip :\par    - Inspection/Palpation : no tenderness, no swelling or deformities\par    - Range of Motion : full and painless in all planes, no crepitance\par    - Stability : joint stability intact\par    - Strength : extension, flexion, adduction, internal rotation and external rotation, abduction, 4-/5\par    - Tests: Irvin’s test negative\par \par o Right Knee :\par ¦ Inspection/Palpation : medial and mild lateral compartment tenderness to palpation, no swelling\par ¦ Range of Motion : active flexion and extension full and painless, no crepitance\par ¦ Stability : no valgus or varus instability present on provocative testing\par ¦ Strength : flexion and extension 4-/5\par ¦ Tests and Signs : negative Anterior Drawer, negative Lachman, negative Zaina \par \par Left Lower Extremity\par o Buttock : no tenderness, swelling or deformities \par o Left Hip :\par    - Inspection/Palpation : tenderness over the greater trochanter and IT band, no swelling or deformities\par    - Range of Motion : full and painless in all planes, no crepitance\par    - Stability : joint stability intact\par    - Strength : extension, flexion, adduction, internal rotation and external rotation, abduction, 4+/5\par    - Tests: Irvin’s test negative\par \par Gait and Station:\par Gait: no abductor lurch, no significant extremity swelling or lymphedema, good proprioception and balance, limited core strength, no foot drop, equal leg lengths

## 2021-06-24 ENCOUNTER — APPOINTMENT (OUTPATIENT)
Dept: ORTHOPEDIC SURGERY | Facility: CLINIC | Age: 66
End: 2021-06-24
Payer: COMMERCIAL

## 2021-06-24 PROCEDURE — 99214 OFFICE O/P EST MOD 30 MIN: CPT

## 2021-06-24 NOTE — DISCUSSION/SUMMARY
[de-identified] : I went over the pathophysiology of the patient's symptoms in great detail with the patient. I am referring the patient to a pain management specialist in order to provide her with longer term relief. I informed the patient that CHRISTINE or SNRB injections will be required to provide them longer term relief from their symptoms. We had a lengthy discussion about the patient's issues, and talked about the benefits and risks of the injections. A referral was provided. At this time, she will start a course of physical therapy for strengthening and flexibility. A prescription for physical therapy was provided.  All of her questions were answered. She understands and consents to the plan. \par \par \par FU 2 weeks \par after pain management consult\par after undergoing PT for her low back and right leg.\par after focusing on weight loss.\par

## 2021-06-24 NOTE — PHYSICAL EXAM
[de-identified] : Constitutional\par o Appearance : well-nourished, well developed, alert, in no acute distress \par Head and Face\par o Head :\par ¦ Inspection : atraumatic, normocephalic\par o Face :\par ¦ Inspection : no visible rash or discoloration\par Respiratory\par o Respiratory Effort: breathing unlabored \par Neurologic\par o Mental Status Examination :\par ¦ Orientation : alert and oriented X 3\par Psychiatric\par o Mood and Affect: mood normal, affect appropriate\par Cardiovascular\par o Observation/Palpation : - no swelling\par Lymphatic\par o Additional Nodes : No palpable lymph nodes present\par \par Lumbosacral Spine\par o Inspection : no visible rash or discoloration\par o Palpation : rightsciatic notch and SI joint tenderness.\par o Range of Motion : rotation causes discomfort, side bending is limited and causes discomfort R/L.\par o Muscle Strength : paraspinal muscle strength and tone within normal limits\par o Muscle Tone : paraspinal muscle strength and tone within normal limits\par o Tests: straight leg test negative and BAILEY test negative bilaterally \par \par Right Lower Extremity\par o Buttock : no tenderness, swelling or deformities \par o Right Hip :\par    - Inspection/Palpation : no tenderness, no swelling or deformities\par    - Range of Motion : full and painless in all planes, no crepitance\par    - Stability : joint stability intact\par    - Strength : extension, flexion, adduction, internal rotation and external rotation, abduction, 4+/5\par    - Tests: Irvin’s test negative\par \par o Right Knee :\par ¦ Inspection/Palpation : medial and mild lateral compartment tenderness to palpation, no swelling\par ¦ Range of Motion : active flexion and extension full and painless, no crepitance\par ¦ Stability : no valgus or varus instability present on provocative testing\par ¦ Strength : flexion and extension 4-/5\par ¦ Tests and Signs : negative Anterior Drawer, negative Lachman, negative Zaina \par \par Left Lower Extremity\par o Buttock : no tenderness, swelling or deformities \par o Left Hip :\par    - Inspection/Palpation : tenderness over the greater trochanter and IT band, no swelling or deformities\par    - Range of Motion : full and painless in all planes, no crepitance\par    - Stability : joint stability intact\par    - Strength : extension, flexion, adduction, internal rotation and external rotation, abduction, 4+/5\par    - Tests: Irvin’s test negative\par \par Gait and Station:\par Gait: no abductor lurch, no significant extremity swelling or lymphedema, good proprioception and balance, limited core strength, no foot drop, equal leg lengths, light sensation oh legs to touch, limited core strength but slightly improved

## 2021-06-24 NOTE — HISTORY OF PRESENT ILLNESS
[de-identified] : 65 year old female presents complaining of left-sided low back pain. She notes she has been using an inversion table that does cause right-sided pain. She is in PT at this point, and has been doing some exercises on her own. She is unable to walk long distances due to the pain and is constantly looking for a place to sit. She notes that she walks very slowly. She denies groin pain, numbness, or tingling. She has never had injections in her back. She also complains of right knee pain that worsens when she walks and ascends stairs. She understands that weight loss is important but has not made much progress. She is continuing to have back pain and stiffness, no numbness and tingling. \par \par MRI of the Lumbar Spine obtained on 9/15/2020 revealed: \par 1. Multilevel lumbar spondylosis most advanced at L2-L3 with a left foraminal/extraforaminal disc protrusion impinging upon the left exiting L2 and left descending L3 nerve roots with mild left foraminal narrowing.\par 2. Mild central disc protrusion at L4-5 with mild to moderate left and moderate right foraminal narrowing.\par 3. Additional degenerative changes including an annular fissure at L3-4.

## 2021-06-24 NOTE — ADDENDUM
[FreeTextEntry1] : Documented by Jose Armando Cates acting as a scribe for Dr. Donte Jarvis (06/24/2021).\par \par All medical record entries made by the Scribe were at my, Dr. Donte Jarvis, direction and personally dictated by me on (06/24/2021). I have reviewed the chart and agree that the record accurately reflects my personal performance of the history, physical exam, assessment and plan. I have also personally directed, reviewed, and agree with the discharge instructions.\par

## 2022-01-05 ENCOUNTER — APPOINTMENT (OUTPATIENT)
Dept: ORTHOPEDIC SURGERY | Facility: CLINIC | Age: 67
End: 2022-01-05
Payer: MEDICARE

## 2022-01-05 DIAGNOSIS — M51.26 OTHER INTERVERTEBRAL DISC DISPLACEMENT, LUMBAR REGION: ICD-10-CM

## 2022-01-05 PROCEDURE — 72100 X-RAY EXAM L-S SPINE 2/3 VWS: CPT

## 2022-01-05 PROCEDURE — 99214 OFFICE O/P EST MOD 30 MIN: CPT

## 2022-01-05 PROCEDURE — 72170 X-RAY EXAM OF PELVIS: CPT

## 2022-01-05 NOTE — ADDENDUM
[FreeTextEntry1] : I, Alexsander Gay, acted solely as a scribe for Dr. Donte Jarvis on this date 01/05/2022.\par All medical record entries made by the Scribe were at my, Dr. Donte Jarvis, direction and personally dictated by me on 01/05/2022. I have reviewed the chart and agree that the record accurately reflects my personal performance of the history, physical exam, assessment and plan. I have also personally directed, reviewed, and agreed with the chart.

## 2022-01-05 NOTE — DISCUSSION/SUMMARY
[de-identified] : I discussed the underlying pathophysiology of the patient's condition in great detail with the patient. I went over the patient's x-rays with them in great detail. The extent of the patient’s arthritis was discussed in great detail with them. We discussed the use of ice, Tylenol and anti-inflammatories to relieve pain. I advised her to take two Celebrex for the next 7-10 days. At this time, she will start a course of physical therapy for strengthening and flexibility. A prescription was provided. A long and detailed discussion ensued about the COVID-19 vaccine and I highly encouraged her to get vaccinated. All of her questions were answered. She understands and consents to the plan.\par \par FU 1 month.\par after undergoing PT for her low back.

## 2022-01-05 NOTE — HISTORY OF PRESENT ILLNESS
[de-identified] : 66 year old female presents complaining of left-sided low back and buttock pain. She notes pain when she sits for long periods of time and is kyphotic upon standing up. She is unable to walk long distances due to the pain and is constantly looking for a place to sit. She notes that she walks very slowly. She denies radiating pain in her legs, groin pain, numbness, or tingling. She has never had injections in her back. We referred her to pain management in June 2021; they recommended an injection but she didn't want it. She also complains of left knee pain that makes putting socks on difficult. She notes that she did PT for her right knee last Summer and it didn't help. She understands that weight loss is important but has not made much progress. She is taking Celebrex, which helps her pain. She is using heat not ice.\par Pmhx of HTN managed medically. She has not completed the COVID vaccine.\par \par MRI of the Lumbar Spine obtained on 9/15/2020 revealed: \par 1. Multilevel lumbar spondylosis most advanced at L2-L3 with a left foraminal/extraforaminal disc protrusion impinging upon the left exiting L2 and left descending L3 nerve roots with mild left foraminal narrowing.\par 2. Mild central disc protrusion at L4-5 with mild to moderate left and moderate right foraminal narrowing.\par 3. Additional degenerative changes including an annular fissure at L3-4.

## 2022-01-05 NOTE — DISCUSSION/SUMMARY
[de-identified] : I discussed the underlying pathophysiology of the patient's condition in great detail with the patient. I went over the patient's x-rays with them in great detail. The extent of the patient’s arthritis was discussed in great detail with them. We discussed the use of ice, Tylenol and anti-inflammatories to relieve pain. I advised her to take two Celebrex for the next 7-10 days. At this time, she will start a course of physical therapy for strengthening and flexibility. A prescription was provided. A long and detailed discussion ensued about the COVID-19 vaccine and I highly encouraged her to get vaccinated. All of her questions were answered. She understands and consents to the plan.\par \par FU 1 month.\par after undergoing PT for her low back.

## 2022-01-05 NOTE — HISTORY OF PRESENT ILLNESS
[de-identified] : 66 year old female presents complaining of left-sided low back and buttock pain. She notes pain when she sits for long periods of time and is kyphotic upon standing up. She is unable to walk long distances due to the pain and is constantly looking for a place to sit. She notes that she walks very slowly. She denies radiating pain in her legs, groin pain, numbness, or tingling. She has never had injections in her back. We referred her to pain management in June 2021; they recommended an injection but she didn't want it. She also complains of left knee pain that makes putting socks on difficult. She notes that she did PT for her right knee last Summer and it didn't help. She understands that weight loss is important but has not made much progress. She is taking Celebrex, which helps her pain. She is using heat not ice.\par Pmhx of HTN managed medically. She has not completed the COVID vaccine.\par \par MRI of the Lumbar Spine obtained on 9/15/2020 revealed: \par 1. Multilevel lumbar spondylosis most advanced at L2-L3 with a left foraminal/extraforaminal disc protrusion impinging upon the left exiting L2 and left descending L3 nerve roots with mild left foraminal narrowing.\par 2. Mild central disc protrusion at L4-5 with mild to moderate left and moderate right foraminal narrowing.\par 3. Additional degenerative changes including an annular fissure at L3-4.

## 2022-01-05 NOTE — PHYSICAL EXAM
[de-identified] : Constitutional\par o Appearance : well-nourished, well developed, alert, in no acute distress \par Head and Face\par o Head :\par ¦ Inspection : atraumatic, normocephalic\par o Face :\par ¦ Inspection : no visible rash or discoloration\par Respiratory\par o Respiratory Effort: breathing unlabored \par Neurologic\par o Mental Status Examination :\par ¦ Orientation : alert and oriented X 3\par Psychiatric\par o Mood and Affect: mood normal, affect appropriate\par Cardiovascular\par o Observation/Palpation : - no swelling\par Lymphatic\par o Additional Nodes : No palpable lymph nodes present\par \par Lumbosacral Spine\par o Inspection : no visible rash or discoloration, kyphotic\par o Palpation : left > right sciatic notch and SI joint tenderness.\par o Range of Motion : extension causes discomfort, sidebending to the left causes discomfort, rotation to the left causes mild discomfort \par o Muscle Strength : paraspinal muscle strength and tone within normal limits\par o Muscle Tone : paraspinal muscle strength and tone within normal limits\par o Tests: straight leg test negative and BAILEY test negative bilaterally \par \par Right Lower Extremity\par o Buttock : no tenderness, swelling or deformities \par o Right Hip :\par  - Inspection/Palpation : no tenderness, no swelling or deformities\par  - Range of Motion : full and painless in all planes, no crepitance\par  - Stability : joint stability intact\par  - Strength : extension, flexion, adduction, internal rotation and external rotation, abduction, 4/5\par  - Tests: Irvin’s test negative\par \par Left Lower Extremity\par o Buttock : no tenderness, swelling or deformities \par o Left Hip :\par  - Inspection/Palpation : tenderness over the greater trochanter and IT band, no swelling or deformities\par  - Range of Motion : full flexion, slightly limited rotation with discomfort, no crepitance\par  - Stability : joint stability intact\par  - Strength : extension, flexion, adduction, internal rotation and external rotation, abduction, 4-/5\par  - Tests: Irvin’s test negative\par \par o Left Knee :\par ¦ Inspection/Palpation : no tenderness to palpation, no swelling\par ¦ Range of Motion : FROM, pain with full flexion, no crepitance\par ¦ Stability : no valgus or varus instability present on provocative testing\par ¦ Strength : flexion and extension 4-/5\par ¦ Tests and Signs : negative Anterior Drawer, negative Lachman, negative Zaina \par \par Gait and Station:\par Gait: no abductor lurch, difficulty on steps, no significant extremity swelling or lymphedema, good proprioception and balance, limited core strength, no foot drop, equal leg lengths\par \par Radiology Results\par o Lumbosacral Spine : AP and lateral views were obtained and revealed facet arthropathy with foraminal stenosis at L4/L5 and L5/S1.\par o Pelvis: AP pelvis was obtained and revealed minimal degenerative arthritis of both hips.

## 2022-01-05 NOTE — PHYSICAL EXAM
[de-identified] : Constitutional\par o Appearance : well-nourished, well developed, alert, in no acute distress \par Head and Face\par o Head :\par ¦ Inspection : atraumatic, normocephalic\par o Face :\par ¦ Inspection : no visible rash or discoloration\par Respiratory\par o Respiratory Effort: breathing unlabored \par Neurologic\par o Mental Status Examination :\par ¦ Orientation : alert and oriented X 3\par Psychiatric\par o Mood and Affect: mood normal, affect appropriate\par Cardiovascular\par o Observation/Palpation : - no swelling\par Lymphatic\par o Additional Nodes : No palpable lymph nodes present\par \par Lumbosacral Spine\par o Inspection : no visible rash or discoloration, kyphotic\par o Palpation : left > right sciatic notch and SI joint tenderness.\par o Range of Motion : extension causes discomfort, sidebending to the left causes discomfort, rotation to the left causes mild discomfort \par o Muscle Strength : paraspinal muscle strength and tone within normal limits\par o Muscle Tone : paraspinal muscle strength and tone within normal limits\par o Tests: straight leg test negative and BAILEY test negative bilaterally \par \par Right Lower Extremity\par o Buttock : no tenderness, swelling or deformities \par o Right Hip :\par  - Inspection/Palpation : no tenderness, no swelling or deformities\par  - Range of Motion : full and painless in all planes, no crepitance\par  - Stability : joint stability intact\par  - Strength : extension, flexion, adduction, internal rotation and external rotation, abduction, 4/5\par  - Tests: Irvin’s test negative\par \par Left Lower Extremity\par o Buttock : no tenderness, swelling or deformities \par o Left Hip :\par  - Inspection/Palpation : tenderness over the greater trochanter and IT band, no swelling or deformities\par  - Range of Motion : full flexion, slightly limited rotation with discomfort, no crepitance\par  - Stability : joint stability intact\par  - Strength : extension, flexion, adduction, internal rotation and external rotation, abduction, 4-/5\par  - Tests: Irvin’s test negative\par \par o Left Knee :\par ¦ Inspection/Palpation : no tenderness to palpation, no swelling\par ¦ Range of Motion : FROM, pain with full flexion, no crepitance\par ¦ Stability : no valgus or varus instability present on provocative testing\par ¦ Strength : flexion and extension 4-/5\par ¦ Tests and Signs : negative Anterior Drawer, negative Lachman, negative Zaina \par \par Gait and Station:\par Gait: no abductor lurch, difficulty on steps, no significant extremity swelling or lymphedema, good proprioception and balance, limited core strength, no foot drop, equal leg lengths\par \par Radiology Results\par o Lumbosacral Spine : AP and lateral views were obtained and revealed facet arthropathy with foraminal stenosis at L4/L5 and L5/S1.\par o Pelvis: AP pelvis was obtained and revealed minimal degenerative arthritis of both hips.

## 2022-02-10 ENCOUNTER — APPOINTMENT (OUTPATIENT)
Dept: ORTHOPEDIC SURGERY | Facility: CLINIC | Age: 67
End: 2022-02-10

## 2022-03-30 ENCOUNTER — APPOINTMENT (OUTPATIENT)
Dept: ORTHOPEDIC SURGERY | Facility: CLINIC | Age: 67
End: 2022-03-30
Payer: MEDICARE

## 2022-03-30 DIAGNOSIS — M70.72 OTHER BURSITIS OF HIP, LEFT HIP: ICD-10-CM

## 2022-03-30 PROCEDURE — 99214 OFFICE O/P EST MOD 30 MIN: CPT

## 2022-03-30 PROCEDURE — 73502 X-RAY EXAM HIP UNI 2-3 VIEWS: CPT | Mod: LT

## 2022-03-30 NOTE — HISTORY OF PRESENT ILLNESS
[de-identified] : 66 year old female presents complaining of left-sided low back and buttock pain. She notes pain when she sits for long periods of time and is kyphotic upon standing up. She is unable to walk long distances due to the pain and is constantly looking for a place to sit. She has difficulty when ascending and descending stairs. She denies radiating pain in her legs, groin pain, numbness, or tingling. She has never had injections in her back. We referred her to pain management in June 2021; they recommended an injection but she didn't want it. She notes difficulty tying her left shoe. She is not currently in PT.\par She understands that weight loss is important but has not made much progress. She is taking Celebrex, which helps her pain.\par Pmhx of HTN managed medically. \par \par Radiology Results taken on 1/5/2022:\par o Lumbosacral Spine : AP and lateral views were obtained and revealed facet arthropathy with foraminal stenosis at L4/L5 and L5/S1.\par \par MRI of the Lumbar Spine obtained on 9/15/2020 revealed: \par 1. Multilevel lumbar spondylosis most advanced at L2-L3 with a left foraminal/extraforaminal disc protrusion impinging upon the left exiting L2 and left descending L3 nerve roots with mild left foraminal narrowing.\par 2. Mild central disc protrusion at L4-5 with mild to moderate left and moderate right foraminal narrowing.\par 3. Additional degenerative changes including an annular fissure at L3-4.

## 2022-03-30 NOTE — ADDENDUM
[FreeTextEntry1] : I, Alexsander Gay, acted solely as a scribe for Dr. Donte Jarvis on this date 03/30/2022.\par All medical record entries made by the Scribe were at my, Dr. Donte Jarvis, direction and personally dictated by me on 03/30/2022. I have reviewed the chart and agree that the record accurately reflects my personal performance of the history, physical exam, assessment and plan. I have also personally directed, reviewed, and agreed with the chart.

## 2022-03-30 NOTE — DISCUSSION/SUMMARY
[de-identified] : I discussed the underlying pathophysiology of the patient's condition in great detail with the patient and her . I went over the patient's x-rays with them in great detail. The extent of the patient’s arthritis was discussed in great detail with them. We discussed the use of ice, Tylenol and anti-inflammatories to relieve pain. I do recommend she gets gel shots in her left knee. I stressed the importance of weight loss and its benefits to the patient’s joints and overall health. At this time, she will start a course of physical therapy for strengthening and flexibility. A prescription was provided. If her back pain does not improve with PT we will repeat her lumbar spine MRI. All of their questions were answered. They understand and consent to the plan.\par \par FU in 4-6 weeks.\par after undergoing PT for her lower back and left hip and knee.\par after focusing on losing weight.

## 2022-03-30 NOTE — PHYSICAL EXAM
[de-identified] : Constitutional\par o Appearance : well-nourished, well developed, alert, in no acute distress \par Head and Face\par o Head :\par ¦ Inspection : atraumatic, normocephalic\par o Face :\par ¦ Inspection : no visible rash or discoloration\par Respiratory\par o Respiratory Effort: breathing unlabored \par Neurologic\par o Mental Status Examination :\par ¦ Orientation : alert and oriented X 3\par Psychiatric\par o Mood and Affect: mood normal, affect appropriate\par Cardiovascular\par o Observation/Palpation : - no swelling\par Lymphatic\par o Additional Nodes : No palpable lymph nodes present\par \par Lumbosacral Spine\par o Inspection : no visible rash or discoloration, kyphotic\par o Palpation : left > right sciatic notch and SI joint tenderness.\par o Range of Motion : extension causes discomfort, sidebending to the left causes discomfort, rotation to the left causes mild discomfort \par o Muscle Strength : paraspinal muscle strength and tone within normal limits\par o Muscle Tone : paraspinal muscle strength and tone within normal limits\par o Tests: straight leg test negative and BAILEY test negative bilaterally \par \par Right Lower Extremity\par o Buttock : no tenderness, swelling or deformities \par o Right Hip :\par  - Inspection/Palpation : no tenderness, no swelling or deformities\par  - Range of Motion : full and painless in all planes, no crepitance\par  - Stability : joint stability intact\par  - Strength : extension, flexion, adduction, internal rotation and external rotation, abduction, 4/5\par  - Tests: Irvin’s test negative\par \par Left Lower Extremity\par o Buttock : no tenderness, swelling or deformities \par o Left Hip :\par  - Inspection/Palpation : tenderness over the greater trochanter and IT band, no swelling or deformities\par  - Range of Motion : full flexion, limited rotation with discomfort, no crepitance\par  - Stability : joint stability intact\par  - Strength : extension, flexion, adduction, internal rotation and external rotation, abduction, 4-/5\par  - Tests: Irvin’s test negative\par \par o Left Knee :\par ¦ Inspection/Palpation : significant medial and mild lateral compartment tenderness to palpation, no swelling\par ¦ Range of Motion : 0-120°, pain with full flexion, no crepitance\par ¦ Stability : no valgus or varus instability present on provocative testing\par ¦ Strength : flexion and extension 4-/5\par ¦ Tests and Signs : negative Anterior Drawer, negative Lachman, negative Zaina \par \par Gait and Station:\par Gait: no abductor lurch, difficulty on steps, no significant extremity swelling or lymphedema, good proprioception and balance, limited core strength, no foot drop, equal leg lengths\par \par Radiology Results\par o Left Hip : AP pelvis and AP and lateral views of the left hip weer obtained and reveal moderate arthritis of both hips.

## 2022-05-05 ENCOUNTER — APPOINTMENT (OUTPATIENT)
Dept: ORTHOPEDIC SURGERY | Facility: CLINIC | Age: 67
End: 2022-05-05
Payer: MEDICARE

## 2022-05-05 DIAGNOSIS — M16.0 BILATERAL PRIMARY OSTEOARTHRITIS OF HIP: ICD-10-CM

## 2022-05-05 PROCEDURE — 99213 OFFICE O/P EST LOW 20 MIN: CPT

## 2022-05-05 NOTE — DISCUSSION/SUMMARY
[de-identified] : I went over the pathophysiology of the patient's symptoms in great detail with the patient. I encouraged her to maintain the exercises and work on strengthening her core. We discussed the use of ice to relieve pain and heat to relieve spasm. I stressed the importance of weight loss and its benefits to the patient’s joints and overall health. She should follow-up in 2-3 months. All of her questions were answered. She understands and consents to the plan.\par \par FU in 2-3 months.

## 2022-05-05 NOTE — ADDENDUM
[FreeTextEntry1] : I, Alexsander Gay, acted solely as a scribe for Dr. Donte Jarvis on this date 05/05/2022.\par All medical record entries made by the Scribe were at my, Dr. Donte Jarvis, direction and personally dictated by me on 05/05/2022. I have reviewed the chart and agree that the record accurately reflects my personal performance of the history, physical exam, assessment and plan. I have also personally directed, reviewed, and agreed with the chart.

## 2022-05-05 NOTE — HISTORY OF PRESENT ILLNESS
[de-identified] : 66 year old female presents complaining of left-sided low back and buttock pain.  She went to PT and thinks that it helped a lot. She was given exercises that she does do on her own. She is able to walk longer distances. She denies radiating pain in her legs, groin pain, numbness, or tingling. She has never had injections in her back. We referred her to pain management in June 2021; they recommended an injection but she didn't want it. \par She understands that weight loss is important but has not made much progress. She is taking Celebrex, which helps her pain. Pmhx of HTN managed medically. \par \par Radiology Results taken on 3/30/2022:\par o Left Hip : AP pelvis and AP and lateral views of the left hip weer obtained and reveal moderate arthritis of both hips.\par \par Radiology Results taken on 1/5/2022:\par o Lumbosacral Spine : AP and lateral views were obtained and revealed facet arthropathy with foraminal stenosis at L4/L5 and L5/S1.\par \par MRI of the Lumbar Spine obtained on 9/15/2020 revealed: \par 1. Multilevel lumbar spondylosis most advanced at L2-L3 with a left foraminal/extraforaminal disc protrusion impinging upon the left exiting L2 and left descending L3 nerve roots with mild left foraminal narrowing.\par 2. Mild central disc protrusion at L4-5 with mild to moderate left and moderate right foraminal narrowing.\par 3. Additional degenerative changes including an annular fissure at L3-4.

## 2022-05-05 NOTE — PHYSICAL EXAM
[de-identified] : Constitutional\par o Appearance : well-nourished, well developed, alert, in no acute distress \par Head and Face\par o Head :\par ¦ Inspection : atraumatic, normocephalic\par o Face :\par ¦ Inspection : no visible rash or discoloration\par Respiratory\par o Respiratory Effort: breathing unlabored \par Neurologic\par o Mental Status Examination :\par ¦ Orientation : alert and oriented X 3\par Psychiatric\par o Mood and Affect: mood normal, affect appropriate\par Cardiovascular\par o Observation/Palpation : - no swelling\par Lymphatic\par o Additional Nodes : No palpable lymph nodes present\par \par Lumbosacral Spine\par o Inspection : no visible rash or discoloration, kyphotic\par o Palpation : left SI joint tenderness\par o Range of Motion : extension causes no discomfort, sidebending to the left causes discomfort felt in the left paralumbar area, rotation causes no discomfort \par o Muscle Strength : paraspinal muscle strength and tone within normal limits\par o Muscle Tone : paraspinal muscle strength and tone within normal limits\par o Tests: straight leg test negative and BAILEY test negative bilaterally \par \par Right Lower Extremity\par o Buttock : no tenderness, swelling or deformities \par o Right Hip :\par  - Inspection/Palpation : no tenderness, no swelling or deformities\par  - Range of Motion : full and painless in all planes, no crepitance\par  - Stability : joint stability intact\par  - Strength : extension, flexion, adduction, internal rotation and external rotation, abduction, 5/5\par  - Tests: Irvin’s test negative\par \par Left Lower Extremity\par o Buttock : no tenderness, swelling or deformities \par o Left Hip :\par  - Inspection/Palpation : no tenderness to palpation, no swelling or deformities\par  - Range of Motion : full flexion, limited rotation with discomfort, no crepitance\par  - Stability : joint stability intact\par  - Strength : extension, flexion, adduction, internal rotation and external rotation, abduction, 4+/5\par  - Tests: Irvin’s test negative\par \par o Left Knee :\par ¦ Inspection/Palpation : significant medial and mild lateral compartment tenderness to palpation, no swelling\par ¦ Range of Motion : 0-120°, pain with full flexion, no crepitance\par ¦ Stability : no valgus or varus instability present on provocative testing\par ¦ Strength : flexion and extension 4-/5\par ¦ Tests and Signs : negative Anterior Drawer, negative Lachman, negative Zaina \par \par Gait and Station:\par Gait: normal gait, no significant extremity swelling or lymphedema, good proprioception and balance, limited core strength, no foot drop, equal leg lengths

## 2022-08-08 ENCOUNTER — APPOINTMENT (OUTPATIENT)
Dept: ORTHOPEDIC SURGERY | Facility: CLINIC | Age: 67
End: 2022-08-08

## 2022-08-08 DIAGNOSIS — M47.816 SPONDYLOSIS W/OUT MYELOPATHY OR RADICULOPATHY, LUMBAR REGION: ICD-10-CM

## 2022-08-08 DIAGNOSIS — M17.0 BILATERAL PRIMARY OSTEOARTHRITIS OF KNEE: ICD-10-CM

## 2022-08-08 DIAGNOSIS — M48.062 SPINAL STENOSIS, LUMBAR REGION WITH NEUROGENIC CLAUDICATION: ICD-10-CM

## 2022-08-08 PROCEDURE — 99214 OFFICE O/P EST MOD 30 MIN: CPT | Mod: 25

## 2022-08-08 PROCEDURE — 20611 DRAIN/INJ JOINT/BURSA W/US: CPT | Mod: RT

## 2022-08-08 PROCEDURE — 73564 X-RAY EXAM KNEE 4 OR MORE: CPT | Mod: RT

## 2022-08-08 NOTE — HISTORY OF PRESENT ILLNESS
[de-identified] : 66 year old female presents complaining of right knee snapping and cracking. She denies an injury. She has been riding in a car for long distances, which aggravated her knee. She does not complain of pain in the right knee. She is not in PT at this point. She still cannot sit or bend for too long due to back pain. She understands that weight loss is important but has not made much progress. Pmhx of HTN managed medically. \par \par Radiology Results 3/30/2022:\par o Left Hip : AP pelvis and AP and lateral views of the left hip weer obtained and reveal moderate arthritis of both hips.\par \par Radiology Results 1/5/2022:\par o Lumbosacral Spine : AP and lateral views were obtained and revealed facet arthropathy with foraminal stenosis at L4/L5 and L5/S1.\par \par MRI of the Lumbar Spine obtained on 9/15/2020 revealed: \par 1. Multilevel lumbar spondylosis most advanced at L2-L3 with a left foraminal/extraforaminal disc protrusion impinging upon the left exiting L2 and left descending L3 nerve roots with mild left foraminal narrowing.\par 2. Mild central disc protrusion at L4-5 with mild to moderate left and moderate right foraminal narrowing.\par 3. Additional degenerative changes including an annular fissure at L3-4.

## 2022-08-08 NOTE — PHYSICAL EXAM
[de-identified] : Constitutional\par o Appearance : well-nourished, well developed, alert, in no acute distress \par Head and Face\par o Head :\par ¦ Inspection : atraumatic, normocephalic\par o Face :\par ¦ Inspection : no visible rash or discoloration\par Respiratory\par o Respiratory Effort: breathing unlabored \par Neurologic\par o Mental Status Examination :\par ¦ Orientation : alert and oriented X 3\par Psychiatric\par o Mood and Affect: mood normal, affect appropriate\par Cardiovascular\par o Observation/Palpation : - no swelling\par Lymphatic\par o Additional Nodes : No palpable lymph nodes present\par \par Lumbosacral Spine\par o Inspection : no visible rash or discoloration, kyphotic\par o Palpation : left SI joint tenderness\par o Range of Motion : extension causes no discomfort, sidebending to the left causes discomfort felt in the left paralumbar area, rotation causes no discomfort \par o Muscle Strength : paraspinal muscle strength and tone within normal limits\par o Muscle Tone : paraspinal muscle strength and tone within normal limits\par o Tests: straight leg test negative and BAILEY test negative bilaterally \par \par Right Lower Extremity\par o Buttock : no tenderness, swelling or deformities \par o Right Hip :\par  - Inspection/Palpation : no tenderness, no swelling or deformities\par  - Range of Motion : full and painless in all planes, no crepitance\par  - Stability : joint stability intact\par  - Strength : extension, flexion, adduction, internal rotation and external rotation, abduction, 5/5\par  - Tests: Irvin’s test negative\par \par o Right Knee :\par ¦ Inspection/Palpation : lateral compartment tenderness to palpation, lateral facet tenderness, patellofemoral tenderness, no swelling\par ¦ Range of Motion : 0-110° , patellofemoral crepitance, decreased patellofemoral glide \par ¦ Stability : no valgus or varus instability present on provocative testing\par ¦ Strength : flexion and extension 5/5\par ¦ Tests and Signs : negative Anterior Drawer, negative Lachman, negative Zaina \par \par Left Lower Extremity\par o Buttock : no tenderness, swelling or deformities \par o Left Hip :\par  - Inspection/Palpation : no tenderness to palpation, no swelling or deformities\par  - Range of Motion : full flexion, limited rotation with discomfort, no crepitance\par  - Stability : joint stability intact\par  - Strength : extension, flexion, adduction, internal rotation and external rotation, abduction, 5/5\par  - Tests: Irvin’s test negative\par \par o Left Knee :\par ¦ Inspection/Palpation : no tenderness to palpation, no swelling\par ¦ Range of Motion : 0-120°, no crepitance, decreased patellofemoral glide \par ¦ Stability : no valgus or varus instability present on provocative testing\par ¦ Strength : flexion and extension 5/5\par ¦ Tests and Signs : negative Anterior Drawer, negative Lachman, negative Zaina \par \par Gait and Station:\par Gait: normal gait, no significant extremity swelling or lymphedema, good proprioception and balance, limited core strength, no foot drop, equal leg lengths\par \par Radiology Results\par o Right Knee : Standing AP, lateral, tunnel, and skyline views of the knee were obtained and reveal moderate medial but severe patellofemoral arthritis. \par \par o Right Knee injection : Indication- knee osteoarthritis, Anatomic location- right intra-articular joint space, Spray - area was sterilized with Betadine and alcohol and anesthetized with Ethyl Chloride, needle used-20G, Medications given- 4cc's Monovisc under Ultrasound guidance, and patient tolerated it well.  oLot# [id number]   oExp: 2024-04-30

## 2022-08-08 NOTE — DISCUSSION/SUMMARY
[de-identified] : I went over the pathophysiology of the patient's symptoms in great detail with the patient and her . I went over the patient's x-rays with them in great detail. The extent of the patient’s arthritis was discussed in great detail with them. The patient elected to receive a Monovisc injection into her right knee today and tolerated it well. I instructed the patient on ROM exercises and told them to take it easy. The use of ice and rest was reviewed with the patient. The patient may resume activities tomorrow. I reminded the patient that it takes 4 to 6 weeks after the injection to feel symptom relief. All of their questions were answered. They understand and consent to the plan.

## 2022-08-08 NOTE — ADDENDUM
[FreeTextEntry1] : I, Alexsander Gay, acted solely as a scribe for Dr. Donte Jarvis on this date 08/08/2022.\par All medical record entries made by the Scribe were at my, Dr. Donte Jarvis, direction and personally dictated by me on 08/08/2022. I have reviewed the chart and agree that the record accurately reflects my personal performance of the history, physical exam, assessment and plan. I have also personally directed, reviewed, and agreed with the chart.

## 2022-10-09 NOTE — DISCUSSION/SUMMARY
[de-identified] : I went over the pathophysiology of the patient's symptoms in great detail with the patient. We discussed the use of ice, Tylenol and anti-inflammatories to relieve pain. At-home strengthening, and stretching exercises were discussed and demonstrated with the patient. We went over the wide ranging benefits of exercise for the patient health and I encouraged her to begin a diligent exercise program. She needs to avoid high-impact activities such as running and jumping. I recommend alternative activities such as riding a stationary bike on low tension, or the elliptical. She should focus on light weight and high repetition exercises. I stressed the importance of continued weight loss and its benefits to the patient’s joints and overall health. At this time, she will start a course of physical therapy for strengthening and flexibility. A prescription for physical therapy was provided. If she does not improve significantly with PT then we may consider sending her to pain management for injections. All of her questions were answered. She understands and consents to the plan.\par \par FU 1 month.\par after undergoing PT for her low back and right leg.\par after focusing on weight loss. [[id number]]
